# Patient Record
Sex: MALE | Race: WHITE | NOT HISPANIC OR LATINO | Employment: FULL TIME | URBAN - METROPOLITAN AREA
[De-identification: names, ages, dates, MRNs, and addresses within clinical notes are randomized per-mention and may not be internally consistent; named-entity substitution may affect disease eponyms.]

---

## 2023-06-28 DIAGNOSIS — S49.91XA INJURY OF RIGHT SHOULDER, INITIAL ENCOUNTER: Primary | ICD-10-CM

## 2023-06-29 ENCOUNTER — HOSPITAL ENCOUNTER (OUTPATIENT)
Dept: RADIOLOGY | Facility: HOSPITAL | Age: 38
Discharge: HOME | End: 2023-06-29
Attending: ORTHOPAEDIC SURGERY
Payer: COMMERCIAL

## 2023-06-29 DIAGNOSIS — S49.91XA INJURY OF RIGHT SHOULDER, INITIAL ENCOUNTER: ICD-10-CM

## 2023-06-29 PROCEDURE — 73221 MRI JOINT UPR EXTREM W/O DYE: CPT | Mod: TC,PN,RT

## 2023-06-29 PROCEDURE — 73221 MRI SHOULDER WITHOUT CONTRAST RIGHT: ICD-10-PCS | Mod: 26,RT,, | Performed by: STUDENT IN AN ORGANIZED HEALTH CARE EDUCATION/TRAINING PROGRAM

## 2023-06-29 PROCEDURE — 73221 MRI JOINT UPR EXTREM W/O DYE: CPT | Mod: 26,RT,, | Performed by: STUDENT IN AN ORGANIZED HEALTH CARE EDUCATION/TRAINING PROGRAM

## 2023-07-05 DIAGNOSIS — S49.91XA INJURY OF RIGHT SHOULDER, INITIAL ENCOUNTER: Primary | ICD-10-CM

## 2023-07-06 ENCOUNTER — CLINICAL SUPPORT (OUTPATIENT)
Dept: REHABILITATION | Facility: HOSPITAL | Age: 38
End: 2023-07-06
Payer: COMMERCIAL

## 2023-07-06 DIAGNOSIS — S49.91XA INJURY OF RIGHT SHOULDER, INITIAL ENCOUNTER: ICD-10-CM

## 2023-07-06 PROCEDURE — 97140 MANUAL THERAPY 1/> REGIONS: CPT | Performed by: PHYSICAL THERAPIST

## 2023-07-06 PROCEDURE — 97110 THERAPEUTIC EXERCISES: CPT | Performed by: PHYSICAL THERAPIST

## 2023-07-06 PROCEDURE — 97161 PT EVAL LOW COMPLEX 20 MIN: CPT | Performed by: PHYSICAL THERAPIST

## 2023-07-06 NOTE — PLAN OF CARE
[facility]   Physical Therapy Initial Evaluation     Date: 7/6/2023     Name: Nael [facility] Number: [id number]  Therapy Diagnosis:   Encounter Diagnosis   Name Primary?    Injury of right shoulder, initial encounter       Physician: Milvia Alvarez, *     Physician Orders: PT Eval and Treat  Medical Diagnosis from Referral:  Encounter Diagnosis   Name Primary?    Injury of right shoulder, initial encounter      Evaluation Date: 7/6/2023  Authorization Period Expiration: 12/31/2023  Plan of Care Expiration: 10/6/2023    Progress Update: 8/6/2023   Visit # / Visits authorized: 1 / 1   FOTO: Visit #1 7/6/2023 - Scored: 1 / 3     PRECAUTIONS: Standard Precautions     Patient School:     Job/Position: Data Unavailable     Time In: 7:00  Time Out: 8:00  Total Appointment Time (timed & untimed codes): 55    SUBJECTIVE   History of current condition - Nael is a 37 y.o. male who presents to physical therapy reporting a sudden onset of right shoulder pain 1 week ago while bench pressing. He felt a pop in his shoulder and had significant pain immediately after. For the first couple days he had pain and difficulty raising his arm above shoulder height. His pain and mobility has improved but the arm still feels weak/unstable. He had an MRI that showed a rotator cuff tear and labral tear and is going to see Dr Patrice Garcia when he gets back from vacation.    Physician Instructions (per patient): PT  Other concerns: none    Imaging: [] Xray [] MRI [] CT: Reviewed    Prior Therapy:  [] No  [] Yes:   Social History: Pt lives with their family   Prior Level of Function: Independent with all activities of daily living  Current Level of Function: Pain and difficulty raising arm above shoulder height    Pain:  Current 2/10, worst 6/10, best 1 /10   Location: [x] Right [] Left [] Bilateral: Shoulder  Description: aching, sharp with movement  Aggravating Factors: Movement above shoulder  height  Easing Factors: activity avoidance, rest    Pts goals: Pt reported goals are to improve pain and function    _______________________________________________________  Medical History:   No past medical history on file.    Surgical History:   Nael Diego  has no past surgical history on file.    Medications:   Nael currently has no medications in their medication list.    Allergies:   Review of patient's allergies indicates:  Not on File       OBJECTIVE     Active Range of Motion: Measured in degrees  Shoulder Left Right   Flexion 180 150   Abduction 180 145   ER at 0 80 80   ER at 90 90 50   IR 80 80     Upper Extremity Strength  Shoulder Left Right   Supraspinatus 4+/5 4-/5   Infraspinatus 4+/5 4-/5   Teres Minor 4+/5 4-/5   Subscapularis 4+/5 4-/5   Middle Trapezius 4+/5 4-/5   Lower Trapezius 4+/5 4-/5   Serratus Anterior 4+/5 4-/5     Special Tests:  Impingement Right   Neer Negative   Carpenter Josiah Positive   Painful Arc Sign Positive   Painful ER MMT Negative   Posterior Impingement Test Negative   Speeds Test Positive     Instability/Labrum Right   Biceps Load II Positive   Schmidt's Test Positive       Palpation Shoulder:  Right LHBT    FUNCTION:   CMS Impairment/Limitation/Restriction for FOTO Shoulder Survey    Therapist reviewed FOTO scores for Nael Diego on 7/6/2023.   FOTO documents entered into [employer] - see Media section.    Limitation Score: %         TREATMENT     Total Treatment time separate from Evaluation: (35) minutes    Nael received the following interventions:     MANUAL THERAPY TECHNIQUES: Joint mobilizations, Soft tissue Mobilization, and mobilization with movement were applied to the area listed below for (10) minutes, including: x = exercises performed   DN Right infraspinatus and teres major c/ stim    THERAPEUTIC EXERCISES: to develop strength, endurance, ROM, flexibility, posture and core stabilization for (25)  minutes including: x = performed today  Seated self  shoulder distraction  Flexion isometric  Abduction isometric  ER isometric    BTB Extension 3x15  BTB ER 3x15  BTB IR 3x15    PATIENT EDUCATION AND HOME EXERCISES     Education/Self-Care provided:  (included in treatment) minutes   Patient educated on the impairments noted above and the effects of physical therapy intervention to improve overall condition and Quality of Life  Patient was educated on all the above exercise prior/during/after for proper posture, positioning, and execution for safe performance with home exercise program.     Written Home Exercises Provided: yes. Prefers: [x] Printed [] Electronic  Exercises were reviewed and Nael was able to demonstrate them prior to the end of the session.  Nael demonstrated good understanding of the education provided. See EMR under Patient Instructions for exercises provided during therapy sessions.      ASSESSMENT     Patient presents with signs and symptoms consistent with a diagnosis of a right rotator cuff and SLAP injury including all above listed objective impairments. It appears that the patients most significant impairments contributing to their diagnosis are decreased cuff strength/activation and decreased posterior shoulder mobility. This pt is a good candidate for skilled PT treatment and stands to benefit from a combination of manual therapy, therapeutic exercise/actvities, neuromuscular re-education, and modalities Prn. The pt has been educated on their dx/POC and consents to further PT treatment.     Pt prognosis is Good.   Pt will benefit from skilled outpatient Physical Therapy to address the deficits stated above and in the chart below, provide pt/family education, and to maximize pt's level of independence.     Plan of care discussed with patient: Yes  Pt's spiritual, cultural and educational needs considered and patient is agreeable to the plan of care and goals as stated below:     Anticipated Barriers for therapy: none    Medical Necessity  is demonstrated by the following:   Medical Necessity is demonstrated by the following  History  Co-morbidities and personal factors that may impact the plan of care [x] LOW: no personal factors / co-morbidities  [] MODERATE: 1-2 personal factors / co-morbidities  [] HIGH: 3+ personal factors / co-morbidities    Moderate / High Support Documentation:   Co-morbidities affecting plan of care:    Personal Factors:      Examination  Body Structures and Functions, activity limitations and participation restrictions that may impact the plan of care [x] LOW: addressing 1-2 elements  [] MODERATE: 3+ elements  [] HIGH: 4+ elements (please support below)    Moderate / High Support Documentation:     Clinical Presentation [x] LOW: stable  [] MODERATE: Evolving  [] HIGH: Unstable     Decision Making/ Complexity Score: low         SHORT TERM GOALS:  4 week Progress Date Met   Recent signs and systems trend is improving in order to progress towards Long term goals.  [] Met  [] Not Met  [] Progressing    Patient will be independent with Home Exercise Program  in order to further progress and return to maximal function. [] Met  [] Not Met  [] Progressing    Pain rating at Worst: 5 /10 in order to progress towards increased independence with activity. [] Met  [] Not Met  [] Progressing    Patient will be able to correct postural deviations in sitting and standing, to decrease pain and promote postural awareness for injury prevention.  [] Met  [] Not Met  [] Progressing    Patient will improve functional outcome (FOTO) score: by 5% to increase self-worth & perceived functional ability towards long term goals [] Met  [] Not Met  [] Progressing      LONG TERM GOALS: 8 weeks Progress Date Met   Patient will return to normal activites of daily living, recreational, and work related activities with less pain and limitation.  [] Met  [] Not Met  [] Progressing    Patient will improve range of motion  to stated goals in order to return to  maximal functional potential.  [] Met  [] Not Met  [] Progressing    Patient will improve Strength to stated goals of appropriate musculature in order to improve functional independence.  [] Met  [] Not Met  [] Progressing    Pain Rating at Best: 1/10 to improve Quality of Life.  [] Met  [] Not Met  [] Progressing    Patient will meet predicted functional outcome (FOTO) score: % to increase self-worth & perceived functional ability. [] Met  [] Not Met  [] Progressing    Patient will have met/partially met personal goal of: working and exercising without pain [] Met  [] Not Met  [] Progressing      PLAN   Plan of care Certification: 7/6/2023 to 10/6/2023    Outpatient Physical Therapy 2 times weekly for 8 weeks to include any combination of the following interventions: virtual visits, dry needling, modalities, electrical stimulation (IFC, Pre-Mod, Attended with Functional Dry Needling), Manual Therapy, Moist Heat/ Ice, Neuromuscular Re-ed, Patient Education, Self Care, Therapeutic Exercise, Functional Training, and Therapeutic Activites     Thank you for this referral.    Catracho Mcnair, PT

## 2023-07-06 NOTE — PROGRESS NOTES
"[facility]   Physical Therapy Initial Evaluation     Date: 7/6/2023     Name: Nael Diego  Clinic Number: [id number]  Therapy Diagnosis:   Encounter Diagnosis   Name Primary?    Injury of right shoulder, initial encounter       Physician: Milvia Alvarez, *     Physician Orders: PT Eval and Treat  Medical Diagnosis from Referral:  Encounter Diagnosis   Name Primary?    Injury of right shoulder, initial encounter      Evaluation Date: 7/6/2023  Authorization Period Expiration: 12/31/2023  Plan of Care Expiration: 10/6/2023    Progress Update: 8/6/2023   Visit # / Visits authorized: 1 / 1   FOTO: Visit #1 7/6/2023 - Scored: 1 / 3     PRECAUTIONS: Standard Precautions     Patient School:     Job/Position: Data Unavailable     Time In: 7:00  Time Out: 8:00  Total Appointment Time (timed & untimed codes): 55    SUBJECTIVE   History of current condition - Nael is a 37 y.o. male who presents to physical therapy reporting ***    Physician Instructions (per patient): ***  Other concerns: none    Imaging: [] Xray [] MRI [] CT: Reviewed    Prior Therapy:  [] No  [] Yes:   Social History: Pt {LIVES WITH:[address]}   Prior Level of Function: Independent with all activities of daily living  Current Level of Function: ***    Pain:  Current {0-10:50323::"0"}/10, worst {0-10:33386::"0"}/10, best {0-10:68765::"0"} /10   Location: [] Right [] Left [] Bilateral: ***  Description: ***  Aggravating Factors: ***  Easing Factors: activity avoidance, rest, ***    Pts goals: Pt reported goals are to improve pain and function    _______________________________________________________  Medical History:   No past medical history on file.    Surgical History:   Nael Diego  has no past surgical history on file.    Medications:   Nael currently has no medications in their medication list.    Allergies:   Review of patient's allergies indicates:  Not on File       OBJECTIVE     ***      FUNCTION:     CMS " Impairment/Limitation/Restriction for FOTO *** Survey    Therapist reviewed FOTO scores for Nael Diego on 7/6/2023.   FOTO documents entered into [employer] - see Media section.    Limitation Score: ***%         TREATMENT     Total Treatment time separate from Evaluation: (***) minutes    Nael received the following interventions:     MANUAL THERAPY TECHNIQUES: Joint mobilizations, Soft tissue Mobilization, and mobilization with movement were applied to the area listed below for (***) minutes, including: x = exercises performed   ***    THERAPEUTIC EXERCISES: to develop strength, endurance, ROM, flexibility, posture and core stabilization for (***)  minutes including: x = performed today  ***    NEUROMUSCULAR RE-EDUCATION activities to improve: Coordination, Kinesthetic, Sense, and Proprioception for (***) minutes. The following activities were included: x = exercises performed   ***    THERAPEUTIC ACTIVITIES: to improve functional performance through dynamic activities, for (***)  minutes including: x = performed today  ***    PATIENT EDUCATION AND HOME EXERCISES     Education/Self-Care provided:  (included in treatment) minutes   Patient educated on the impairments noted above and the effects of physical therapy intervention to improve overall condition and Quality of Life  Patient was educated on all the above exercise prior/during/after for proper posture, positioning, and execution for safe performance with home exercise program.     Written Home Exercises Provided: yes. Prefers: [x] Printed [] Electronic  Exercises were reviewed and Nael was able to demonstrate them prior to the end of the session.  Nael demonstrated good understanding of the education provided. See EMR under Patient Instructions for exercises provided during therapy sessions.      ASSESSMENT     Patient presents with signs and symptoms consistent with a diagnosis of *** including all above listed objective impairments. It appears that the  patients most significant impairments contributing to their diagnosis are ***. This pt is a good candidate for skilled PT treatment and stands to benefit from a combination of manual therapy, therapeutic exercise/actvities, neuromuscular re-education, and modalities Prn. The pt has been educated on their dx/POC and consents to further PT treatment.     Pt prognosis is Good.   Pt will benefit from skilled outpatient Physical Therapy to address the deficits stated above and in the chart below, provide pt/family education, and to maximize pt's level of independence.     Plan of care discussed with patient: Yes  Pt's spiritual, cultural and educational needs considered and patient is agreeable to the plan of care and goals as stated below:     Anticipated Barriers for therapy: {barriers for care:30297}    Medical Necessity is demonstrated by the following:   Medical Necessity is demonstrated by the following  History  Co-morbidities and personal factors that may impact the plan of care [x] LOW: no personal factors / co-morbidities  [] MODERATE: 1-2 personal factors / co-morbidities  [] HIGH: 3+ personal factors / co-morbidities    Moderate / High Support Documentation:   Co-morbidities affecting plan of care:    Personal Factors:      Examination  Body Structures and Functions, activity limitations and participation restrictions that may impact the plan of care [x] LOW: addressing 1-2 elements  [] MODERATE: 3+ elements  [] HIGH: 4+ elements (please support below)    Moderate / High Support Documentation:     Clinical Presentation [x] LOW: stable  [] MODERATE: Evolving  [] HIGH: Unstable     Decision Making/ Complexity Score: low         SHORT TERM GOALS:  4 week Progress Date Met   Recent signs and systems trend is improving in order to progress towards Long term goals.  [] Met  [] Not Met  [] Progressing    Patient will be independent with Home Exercise Program  in order to further progress and return to maximal  function. [] Met  [] Not Met  [] Progressing    Pain rating at Worst: 5 /10 in order to progress towards increased independence with activity. [] Met  [] Not Met  [] Progressing    Patient will be able to correct postural deviations in sitting and standing, to decrease pain and promote postural awareness for injury prevention.  [] Met  [] Not Met  [] Progressing    Patient will improve functional outcome (FOTO) score: by 5% to increase self-worth & perceived functional ability towards long term goals [] Met  [] Not Met  [] Progressing      LONG TERM GOALS: 8 weeks Progress Date Met   Patient will return to normal activites of daily living, recreational, and work related activities with less pain and limitation.  [] Met  [] Not Met  [] Progressing    Patient will improve range of motion  to stated goals in order to return to maximal functional potential.  [] Met  [] Not Met  [] Progressing    Patient will improve Strength to stated goals of appropriate musculature in order to improve functional independence.  [] Met  [] Not Met  [] Progressing    Pain Rating at Best: 1/10 to improve Quality of Life.  [] Met  [] Not Met  [] Progressing    Patient will meet predicted functional outcome (FOTO) score: ***% to increase self-worth & perceived functional ability. [] Met  [] Not Met  [] Progressing    Patient will have met/partially met personal goal of: *** [] Met  [] Not Met  [] Progressing          PLAN   Plan of care Certification: 7/6/2023 to ***/2023    Outpatient Physical Therapy 2 times weekly for 8 weeks to include any combination of the following interventions: virtual visits, dry needling, modalities, electrical stimulation (IFC, Pre-Mod, Attended with Functional Dry Needling), Manual Therapy, Moist Heat/ Ice, Neuromuscular Re-ed, Patient Education, Self Care, Therapeutic Exercise, Functional Training, and Therapeutic Activites     Thank you for this referral.    Catracho Mcnair, PT

## 2023-07-17 ENCOUNTER — CLINICAL SUPPORT (OUTPATIENT)
Dept: REHABILITATION | Facility: HOSPITAL | Age: 38
End: 2023-07-17
Payer: COMMERCIAL

## 2023-07-17 DIAGNOSIS — M25.611 DECREASED ROM OF RIGHT SHOULDER: ICD-10-CM

## 2023-07-17 DIAGNOSIS — R29.898 SHOULDER WEAKNESS: ICD-10-CM

## 2023-07-17 PROCEDURE — 97112 NEUROMUSCULAR REEDUCATION: CPT | Performed by: PHYSICAL THERAPIST

## 2023-07-17 NOTE — PROGRESS NOTES
[facility]   Physical Therapy Treatment Note     Name: Nael [facility] Number: [id number]    Therapy Diagnosis:   Encounter Diagnoses   Name Primary?    Decreased ROM of right shoulder     Shoulder weakness      Physician: Milvia Alvarez, *    Visit Date: 7/17/2023    Evaluation Date: 7/6/2023  Authorization Period Expiration: 12/31/2023  Plan of Care Expiration: 10/6/2023                Progress Update: 8/6/2023     Visit # / Visits authorized: 1 / 1          FOTO: Visit #1 7/6/2023 - Scored: 1 / 3     Time In: 11:40  Time Out: 12:30  Total Billable Time: 50 minutes    SUBJECTIVE     Pt reports: Shoulder has been feeling better. Still some apprehension with certain movements.  He was compliant with home exercise program.  Response to previous treatment: Improved pain  Functional change: improved mobility    Pain: 2/10  Location: right shoulder    OBJECTIVE       Objective measures taken at Evaluation on 7/6/2023:    Active Range of Motion: Measured in degrees  Shoulder Left Right   Flexion 180 150   Abduction 180 145   ER at 0 80 80   ER at 90 90 50   IR 80 80      Upper Extremity Strength  Shoulder Left Right   Supraspinatus 4+/5 4-/5   Infraspinatus 4+/5 4-/5   Teres Minor 4+/5 4-/5   Subscapularis 4+/5 4-/5   Middle Trapezius 4+/5 4-/5   Lower Trapezius 4+/5 4-/5   Serratus Anterior 4+/5 4-/5       Treatment     Nael received the treatments listed below:      therapeutic exercises to develop strength, endurance, ROM, and flexibility for 00 minutes including:    manual therapy techniques: Joint mobilizations and Soft tissue Mobilization were applied to the: right shoulder for 00 minutes, including:    neuromuscular re-education activities to improve: Coordination, Kinesthetic, Sense, Proprioception, and Posture for 40 minutes. The following activities were included:  Cable extensions 10# 3x10  Cable ER 7.5# 3x10  Cable IR 10# 3x10  Cable Reverse Fly 5# 3x10  Cable Y  Raise 2.5# 3x10      Patient Education and Home Exercises     Home Exercises Provided and Patient Education Provided     Education provided:   - HEP, PT POC    Written Home Exercises Provided: yes. Exercises were reviewed and Nael was able to demonstrate them prior to the end of the session.  Nael demonstrated good  understanding of the education provided. See EMR under Patient Instructions for exercises provided during therapy sessions    ASSESSMENT     No increased pain with any exercises today. Patient's pain does still seem to be labral in origin. Focusing heavily on improving patient's strength and control of his cuff and scapular stabilizers.    Nael Is progressing well towards his goals.   Pt prognosis is Excellent.     Pt will continue to benefit from skilled outpatient physical therapy to address the deficits listed in the problem list box on initial evaluation, provide pt/family education and to maximize pt's level of independence in the home and community environment.     Pt's spiritual, cultural and educational needs considered and pt agreeable to plan of care and goals.     Anticipated barriers to physical therapy: None    Goals:  SHORT TERM GOALS:  4 week Progress Date Met   Recent signs and systems trend is improving in order to progress towards Long term goals.  [] Met  [] Not Met  [] Progressing     Patient will be independent with Home Exercise Program  in order to further progress and return to maximal function. [] Met  [] Not Met  [] Progressing     Pain rating at Worst: 5 /10 in order to progress towards increased independence with activity. [] Met  [] Not Met  [] Progressing     Patient will be able to correct postural deviations in sitting and standing, to decrease pain and promote postural awareness for injury prevention.  [] Met  [] Not Met  [] Progressing     Patient will improve functional outcome (FOTO) score: by 5% to increase self-worth & perceived functional ability towards long  term goals [] Met  [] Not Met  [] Progressing        LONG TERM GOALS: 8 weeks Progress Date Met   Patient will return to normal activites of daily living, recreational, and work related activities with less pain and limitation.  [] Met  [] Not Met  [] Progressing     Patient will improve range of motion  to stated goals in order to return to maximal functional potential.  [] Met  [] Not Met  [] Progressing     Patient will improve Strength to stated goals of appropriate musculature in order to improve functional independence.  [] Met  [] Not Met  [] Progressing     Pain Rating at Best: 1/10 to improve Quality of Life.  [] Met  [] Not Met  [] Progressing     Patient will meet predicted functional outcome (FOTO) score: % to increase self-worth & perceived functional ability. [] Met  [] Not Met  [] Progressing     Patient will have met/partially met personal goal of: working and exercising without pain [] Met  [] Not Met  [] Progressing        PLAN   Continue per plan of care.  Progress as tolerated.    Catracho Mcnair, PT

## 2025-06-09 ENCOUNTER — OFFICE VISIT (OUTPATIENT)
Facility: CLINIC | Age: 40
End: 2025-06-09
Payer: COMMERCIAL

## 2025-06-09 VITALS
WEIGHT: 185 LBS | SYSTOLIC BLOOD PRESSURE: 111 MMHG | DIASTOLIC BLOOD PRESSURE: 76 MMHG | BODY MASS INDEX: 27.4 KG/M2 | HEART RATE: 73 BPM | HEIGHT: 69 IN

## 2025-06-09 DIAGNOSIS — Z30.2 ENCOUNTER FOR STERILIZATION: Primary | ICD-10-CM

## 2025-06-09 LAB
BILIRUB UR QL STRIP: NEGATIVE
GLUCOSE UR QL STRIP: NEGATIVE
KETONES UR QL STRIP: NEGATIVE
LEUKOCYTE ESTERASE UR QL STRIP: NEGATIVE
PH, POC UA: 7
POC BLOOD, URINE: POSITIVE
POC NITRATES, URINE: NEGATIVE
PROT UR QL STRIP: NEGATIVE
SP GR UR STRIP: 10.1 (ref 1–1.03)
UROBILINOGEN UR STRIP-ACNC: 0.2 (ref 0.3–2.2)

## 2025-06-09 PROCEDURE — 81003 URINALYSIS AUTO W/O SCOPE: CPT | Mod: QW,S$GLB,, | Performed by: UROLOGY

## 2025-06-09 PROCEDURE — 99203 OFFICE O/P NEW LOW 30 MIN: CPT | Mod: S$GLB,,, | Performed by: UROLOGY

## 2025-06-09 PROCEDURE — 3078F DIAST BP <80 MM HG: CPT | Mod: CPTII,S$GLB,, | Performed by: UROLOGY

## 2025-06-09 PROCEDURE — 99999 PR PBB SHADOW E&M-EST. PATIENT-LVL III: CPT | Mod: PBBFAC,,, | Performed by: UROLOGY

## 2025-06-09 PROCEDURE — 3074F SYST BP LT 130 MM HG: CPT | Mod: CPTII,S$GLB,, | Performed by: UROLOGY

## 2025-06-09 PROCEDURE — 3008F BODY MASS INDEX DOCD: CPT | Mod: CPTII,S$GLB,, | Performed by: UROLOGY

## 2025-06-09 PROCEDURE — 1159F MED LIST DOCD IN RCRD: CPT | Mod: CPTII,S$GLB,, | Performed by: UROLOGY

## 2025-06-09 RX ORDER — DIAZEPAM 10 MG/1
10 TABLET ORAL SEE ADMIN INSTRUCTIONS
Qty: 1 TABLET | Refills: 0 | Status: SHIPPED | OUTPATIENT
Start: 2025-06-09 | End: 2025-07-09

## 2025-06-09 NOTE — PROGRESS NOTES
"Subjective:       Patient ID: Nael Diego is a 39 y.o. male.    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  He says that both he and his wife want him to undergo a vasectomy.           History reviewed. No pertinent past medical history.  No family history on file.  Social History[1]  Encounter Medications[2]     Review of Systems    Objective:     /76   Pulse 73   Ht 5' 9" (1.753 m)   Wt 83.9 kg (185 lb)   BMI 27.32 kg/m²     Physical Exam  Constitutional:       Appearance: Normal appearance.   Pulmonary:      Effort: Pulmonary effort is normal.   Abdominal:      Palpations: Abdomen is soft.   Neurological:      Mental Status: He is alert and oriented to person, place, and time.   Psychiatric:         Mood and Affect: Mood normal.         Office Visit on 06/09/2025   Component Date Value Ref Range Status    POC Blood, Urine 06/09/2025 Positive (A)  Negative Final    POC Bilirubin, Urine 06/09/2025 Negative  Negative Final    POC Urobilinogen, Urine 06/09/2025 0.2 (A)  0.3 - 2.2 Final    POC Ketones, Urine 06/09/2025 Negative  Negative Final    POC Protein, Urine 06/09/2025 Negative  Negative Final    POC Nitrates, Urine 06/09/2025 Negative  Negative Final    POC Glucose, Urine 06/09/2025 Negative  Negative Final    pH, UA 06/09/2025 7.0   Final    POC Specific Gravity, Urine 06/09/2025 10.10 (A)  1.003 - 1.029 Final    POC Leukocytes, Urine 06/09/2025 Negative  Negative Final        No results found for this or any previous visit (from the past 8760 hours).     Assessment:       1. Encounter for sterilization        Plan:     Orders Placed This Encounter    POCT Urinalysis, Dipstick, Automated, W/O Scope    diazePAM (VALIUM) 10 MG Tab          Assessment:  - Encounter for sterilization.    Plan:  - I had a discussion with the patient today regarding the risks and benefits of a vasectomy.  I explained that " he is not considered sterile until after he has at least 1 semen sample that shows no sperm after the vasectomy.  He understands that there is a possibility of recanalization of the vas deferens creating fertility.  I recommended he stay off his feet and rest as much as he can for at least 3 days after the vasectomy.  I recommended no lifting more than 10 pounds, no ejaculations, and no sexual activity for at least 1 week after the vasectomy.  I recommended at least 25 ejaculations and waiting at least 10 weeks from the vasectomy prior to doing the semen analysis.  I discussed the risks of scrotal and testicular swelling, infection, bleeding, pain, and the possible need for hospitalization and/or the need for further surgery.  All of his questions were answered by me to his apparent understanding and to his apparent satisfaction.  The patient says that he would like to proceed with the vasectomy.  He says that he will reach out to me when he is ready to schedule the vasectomy.                       [1]   Social History  Socioeconomic History    Marital status:    Tobacco Use    Smoking status: Never    Smokeless tobacco: Never   Substance and Sexual Activity    Sexual activity: Yes   [2]   Outpatient Encounter Medications as of 6/9/2025   Medication Sig Dispense Refill    diazePAM (VALIUM) 10 MG Tab Take 1 tablet (10 mg total) by mouth As instructed (Take 1 by mouth as needed for anxiety 30 mintues prior to the procedure.). 1 tablet 0     No facility-administered encounter medications on file as of 6/9/2025.